# Patient Record
Sex: MALE | Race: OTHER
[De-identification: names, ages, dates, MRNs, and addresses within clinical notes are randomized per-mention and may not be internally consistent; named-entity substitution may affect disease eponyms.]

---

## 2018-03-28 ENCOUNTER — HOSPITAL ENCOUNTER (EMERGENCY)
Dept: HOSPITAL 25 - UCEAST | Age: 53
Discharge: HOME | End: 2018-03-28
Payer: COMMERCIAL

## 2018-03-28 VITALS — SYSTOLIC BLOOD PRESSURE: 137 MMHG | DIASTOLIC BLOOD PRESSURE: 91 MMHG

## 2018-03-28 DIAGNOSIS — F17.210: ICD-10-CM

## 2018-03-28 DIAGNOSIS — H61.23: Primary | ICD-10-CM

## 2018-03-28 PROCEDURE — G0463 HOSPITAL OUTPT CLINIC VISIT: HCPCS

## 2018-03-28 PROCEDURE — 99213 OFFICE O/P EST LOW 20 MIN: CPT

## 2018-03-28 NOTE — UC
Ear Complaint HPI





- HPI Summary


HPI Summary: 





51 yo male presents with mild left otalgia and decreased hearing x days


no uri symptoms











- History of Current Complaint


Chief Complaint: UCEar


Stated Complaint: EAR COMPLAINT


Time Seen by Provider: 03/28/18 09:38


Hx Obtained From: Patient


Onset/Duration: Gradual Onset, Lasting Days


Severity Initially: Mild


Severity Currently: Mild


Pain Intensity: 4


Pain Scale Used: 0-10 Numeric


Associated Signs/Symptoms: Positive: Hearing Loss





- Allergies/Home Medications


Allergies/Adverse Reactions: 


 Allergies











Allergy/AdvReac Type Severity Reaction Status Date / Time


 


No Known Allergies Allergy   Verified 03/28/18 09:23











Home Medications: 


 Home Medications





NK [No Home Medications Reported]  03/28/18 [History Confirmed 03/28/18]











PMH/Surg Hx/FS Hx/Imm Hx


Previously Healthy: Yes





- Surgical History


Surgical History: None





- Family History


Known Family History: Positive: Hypertension





- Social History


Alcohol Use: None


Alcohol Amount: Social drinker.


Substance Use Type: None


Smoking Status (MU): Current Every Day Smoker


Type: Cigarettes


Amount Used/How Often: 1 ppd x 30 years


Length of Time of Smoking/Using Tobacco: 30 years


Have You Smoked in the Last Year: Yes





Review of Systems


Constitutional: Negative


Skin: Negative


Eyes: Negative


ENT: Ear Ache


Respiratory: Negative


Cardiovascular: Negative


Gastrointestinal: Negative


Genitourinary: Negative


Motor: Negative


Neurovascular: Negative


Musculoskeletal: Negative


Neurological: Negative


Psychological: Negative


Is Patient Immunocompromised?: No


All Other Systems Reviewed And Are Negative: Yes





Physical Exam


Triage Information Reviewed: Yes


Appearance: Well-Appearing, No Pain Distress, Well-Nourished


Vital Signs: 


 Initial Vital Signs











Temp  98 F   03/28/18 09:27


 


Pulse  72   03/28/18 09:27


 


Resp  16   03/28/18 09:27


 


BP  137/91   03/28/18 09:27


 


Pulse Ox  99   03/28/18 09:27











Vital Signs Reviewed: Yes


Eyes: Positive: Conjunctiva Clear


ENT: Positive: TMs normal - cerumen bilat, Uvula midline.  Negative: Hearing 

grossly normal, Nasal congestion, Nasal drainage, Tonsillar swelling, Tonsillar 

exudate, Trismus, Muffled voice


Neck: Positive: Supple


Respiratory: Positive: Lungs clear, Normal breath sounds, No respiratory 

distress, No accessory muscle use


Cardiovascular: Positive: RRR, No Murmur


Musculoskeletal: Positive: ROM Intact, No Edema


Neurological Exam: Normal


Neurological: Positive: Alert


Psychological Exam: Normal


Skin Exam: Normal





Re-Evaluation





- Re-Evaluation


  ** First Eval


Re-Evaluation Time: 09:55


Change: Improved - hearing normal, both TMs normal





Ear Complaint Course/Dx





- Differential Dx/Diagnosis


Provider Diagnoses: cerumen impaction





Discharge





- Sign-Out/Discharge


Documenting (check all that apply): Discharge





- Discharge Plan


Condition: Stable


Disposition: HOME


Patient Education Materials:  Cerumen Impaction (ED)


Referrals: 


Vadim Murray MD [Primary Care Provider] - 


Additional Instructions: 


call for any questions





return for any problems





- Billing Disposition and Condition


Condition: STABLE


Disposition: HOME

## 2018-07-25 ENCOUNTER — HOSPITAL ENCOUNTER (EMERGENCY)
Dept: HOSPITAL 25 - UCEAST | Age: 53
Discharge: HOME | End: 2018-07-25
Payer: COMMERCIAL

## 2018-07-25 VITALS — SYSTOLIC BLOOD PRESSURE: 129 MMHG | DIASTOLIC BLOOD PRESSURE: 93 MMHG

## 2018-07-25 DIAGNOSIS — F17.210: ICD-10-CM

## 2018-07-25 DIAGNOSIS — H93.8X1: Primary | ICD-10-CM

## 2018-07-25 DIAGNOSIS — I10: ICD-10-CM

## 2018-07-25 DIAGNOSIS — H92.01: ICD-10-CM

## 2018-07-25 PROCEDURE — G0463 HOSPITAL OUTPT CLINIC VISIT: HCPCS

## 2018-07-25 PROCEDURE — 99211 OFF/OP EST MAY X REQ PHY/QHP: CPT

## 2019-08-15 ENCOUNTER — HOSPITAL ENCOUNTER (EMERGENCY)
Dept: HOSPITAL 25 - UCEAST | Age: 54
Discharge: HOME | End: 2019-08-15
Payer: COMMERCIAL

## 2019-08-15 VITALS — DIASTOLIC BLOOD PRESSURE: 87 MMHG | SYSTOLIC BLOOD PRESSURE: 129 MMHG

## 2019-08-15 DIAGNOSIS — F17.210: ICD-10-CM

## 2019-08-15 DIAGNOSIS — H65.93: Primary | ICD-10-CM

## 2019-08-15 PROCEDURE — G0463 HOSPITAL OUTPT CLINIC VISIT: HCPCS

## 2019-08-15 PROCEDURE — 99213 OFFICE O/P EST LOW 20 MIN: CPT

## 2019-08-15 NOTE — UC
Ear Complaint HPI





- HPI Summary


HPI Summary: 





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





just returned from [place]





no f/c





ear pain L>>R





- History of Current Complaint


Chief Complaint: UCEar


Stated Complaint: EAR BLOCKAGE


Time Seen by Provider: 08/15/19 11:19


Hx Obtained From: Patient


Onset/Duration: Gradual Onset, Lasting Days


Severity Initially: Mild


Severity Currently: Mild


Pain Intensity: 4


Pain Scale Used: 0-10 Numeric


Associated Signs/Symptoms: Positive: Hearing Loss





- Allergies/Home Medications


Allergies/Adverse Reactions: 


 Allergies











Allergy/AdvReac Type Severity Reaction Status Date / Time


 


No Known Allergies Allergy   Verified 08/15/19 11:08











Home Medications: 


 Home Medications





Lansoprazole [Prevacid] 1 tab PO BID 08/15/19 [History Confirmed 08/15/19]


Lovastatin [Altoprev] 1 tab PO DAILY 08/15/19 [History Confirmed 08/15/19]


Sucralfate [Carafate] 1 tab PO DAILY 08/15/19 [History Confirmed 08/15/19]











PMH/Surg Hx/FS Hx/Imm Hx


Previously Healthy: Yes





- Surgical History


Surgical History: None





- Family History


Known Family History: Positive: Hypertension, Non-Contributory





- Social History


Alcohol Use: Occasionally


Alcohol Amount: Social drinker.


Substance Use Type: None


Smoking Status (MU): Current Every Day Smoker


Type: Cigarettes


Amount Used/How Often: 1/2 ppd x 30 years


Length of Time of Smoking/Using Tobacco: 30 years


Have You Smoked in the Last Year: Yes


Household Exposure Type: Cigarettes





Review of Systems


All Other Systems Reviewed And Are Negative: Yes


Constitutional: Positive: Negative


Skin: Positive: Negative


Eyes: Positive: Negative


ENT: Positive: Ear Ache


Respiratory: Positive: Negative


Cardiovascular: Positive: Negative


Gastrointestinal: Positive: Negative


Genitourinary: Positive: Negative


Motor: Positive: Negative


Neurovascular: Positive: Negative


Musculoskeletal: Positive: Negative


Neurological: Positive: Negative


Psychological: Positive: Negative





Physical Exam


Triage Information Reviewed: Yes


Appearance: Well-Appearing, No Pain Distress, Well-Nourished


Vital Signs: 


 Initial Vital Signs











Temp  98.6 F   08/15/19 11:02


 


Pulse  87   08/15/19 11:02


 


Resp  18   08/15/19 11:02


 


BP  129/87   08/15/19 11:02


 


Pulse Ox  100   08/15/19 11:02











Vital Signs Reviewed: Yes


Eyes: Positive: Conjunctiva Clear


ENT: Positive: TM bulging - Right bulging and slight redness, cerumen in right 

EAC was cleared with flush, left TM retracted.  Negative: Hearing grossly normal

, Nasal congestion, Nasal drainage


Dental Exam: Normal


Neck: Positive: Supple, Nontender, No Lymphadenopathy


Respiratory: Positive: No respiratory distress


Cardiovascular: Positive: RRR


Musculoskeletal: Positive: ROM Intact, No Edema


Neurological: Positive: Alert


Psychological Exam: Normal


Skin Exam: Normal





Ear Complaint Course/Dx





- Course


Course Of Treatment: 





possible right OM





- Differential Dx/Diagnosis


Provider Diagnosis: 


 Bilateral serous otitis media








Discharge





- Sign-Out/Discharge


Documenting (check all that apply): Patient Departure


All imaging exams completed and their final reports reviewed: No Studies





- Discharge Plan


Condition: Stable


Disposition: HOME


Prescriptions: 


Amoxicillin PO (*) [Amoxicillin 875 MG (*)] 875 mg PO BID #14 tab


Patient Education Materials:  Serous Otitis Media (ED)


Referrals: 


Vadim Murray MD [Primary Care Provider] - 


Additional Instructions: 


use your flonase 2 sprays each nostril twice daily for 2 weeks





recheck in 2 weeks if ears not completely back to normal





- Billing Disposition and Condition


Condition: STABLE


Disposition: Home